# Patient Record
Sex: FEMALE | Race: WHITE
[De-identification: names, ages, dates, MRNs, and addresses within clinical notes are randomized per-mention and may not be internally consistent; named-entity substitution may affect disease eponyms.]

---

## 2017-04-14 ENCOUNTER — TRANSCRIPTION ENCOUNTER (OUTPATIENT)
Age: 77
End: 2017-04-14

## 2017-07-19 ENCOUNTER — TRANSCRIPTION ENCOUNTER (OUTPATIENT)
Age: 77
End: 2017-07-19

## 2017-09-02 ENCOUNTER — TRANSCRIPTION ENCOUNTER (OUTPATIENT)
Age: 77
End: 2017-09-02

## 2017-11-29 ENCOUNTER — TRANSCRIPTION ENCOUNTER (OUTPATIENT)
Age: 77
End: 2017-11-29

## 2018-11-08 ENCOUNTER — TRANSCRIPTION ENCOUNTER (OUTPATIENT)
Age: 78
End: 2018-11-08

## 2019-06-27 ENCOUNTER — TRANSCRIPTION ENCOUNTER (OUTPATIENT)
Age: 79
End: 2019-06-27

## 2019-09-05 ENCOUNTER — TRANSCRIPTION ENCOUNTER (OUTPATIENT)
Age: 79
End: 2019-09-05

## 2019-09-23 ENCOUNTER — TRANSCRIPTION ENCOUNTER (OUTPATIENT)
Age: 79
End: 2019-09-23

## 2019-11-17 ENCOUNTER — TRANSCRIPTION ENCOUNTER (OUTPATIENT)
Age: 79
End: 2019-11-17

## 2020-02-03 ENCOUNTER — TRANSCRIPTION ENCOUNTER (OUTPATIENT)
Age: 80
End: 2020-02-03

## 2020-07-21 ENCOUNTER — TRANSCRIPTION ENCOUNTER (OUTPATIENT)
Age: 80
End: 2020-07-21

## 2020-10-04 ENCOUNTER — TRANSCRIPTION ENCOUNTER (OUTPATIENT)
Age: 80
End: 2020-10-04

## 2021-07-02 ENCOUNTER — TRANSCRIPTION ENCOUNTER (OUTPATIENT)
Age: 81
End: 2021-07-02

## 2022-02-04 PROBLEM — Z00.00 ENCOUNTER FOR PREVENTIVE HEALTH EXAMINATION: Status: ACTIVE | Noted: 2022-02-04

## 2022-02-06 ENCOUNTER — NON-APPOINTMENT (OUTPATIENT)
Age: 82
End: 2022-02-06

## 2022-02-07 ENCOUNTER — APPOINTMENT (OUTPATIENT)
Dept: OTOLARYNGOLOGY | Facility: CLINIC | Age: 82
End: 2022-02-07
Payer: MEDICARE

## 2022-02-07 VITALS — WEIGHT: 127 LBS | TEMPERATURE: 97.8 F | BODY MASS INDEX: 24.94 KG/M2 | HEIGHT: 60 IN

## 2022-02-07 DIAGNOSIS — H92.09 OTALGIA, UNSPECIFIED EAR: ICD-10-CM

## 2022-02-07 DIAGNOSIS — Z86.59 PERSONAL HISTORY OF OTHER MENTAL AND BEHAVIORAL DISORDERS: ICD-10-CM

## 2022-02-07 DIAGNOSIS — Z87.891 PERSONAL HISTORY OF NICOTINE DEPENDENCE: ICD-10-CM

## 2022-02-07 DIAGNOSIS — Z78.9 OTHER SPECIFIED HEALTH STATUS: ICD-10-CM

## 2022-02-07 DIAGNOSIS — J06.9 ACUTE UPPER RESPIRATORY INFECTION, UNSPECIFIED: ICD-10-CM

## 2022-02-07 DIAGNOSIS — Z86.79 PERSONAL HISTORY OF OTHER DISEASES OF THE CIRCULATORY SYSTEM: ICD-10-CM

## 2022-02-07 DIAGNOSIS — J02.9 ACUTE PHARYNGITIS, UNSPECIFIED: ICD-10-CM

## 2022-02-07 PROCEDURE — 99203 OFFICE O/P NEW LOW 30 MIN: CPT

## 2022-02-07 PROCEDURE — 31575 DIAGNOSTIC LARYNGOSCOPY: CPT

## 2022-02-07 RX ORDER — DOCUSATE SODIUM 100 MG/1
CAPSULE ORAL
Refills: 0 | Status: ACTIVE | COMMUNITY

## 2022-02-07 RX ORDER — AMLODIPINE BESYLATE 5 MG/1
5 TABLET ORAL
Refills: 0 | Status: ACTIVE | COMMUNITY

## 2022-02-07 RX ORDER — IRBESARTAN 300 MG/1
TABLET ORAL
Refills: 0 | Status: ACTIVE | COMMUNITY

## 2022-02-07 RX ORDER — FLURAZEPAM HYDROCHLORIDE 15 MG/1
15 CAPSULE ORAL
Refills: 0 | Status: ACTIVE | COMMUNITY

## 2022-02-07 NOTE — HISTORY OF PRESENT ILLNESS
[de-identified] : Patient reports January 21 had symptoms of sore throat, shivers for an hour in the morning, and right otalgia radiating to the neck.  She presented to ENT and allergy when a Covid test was negative that same day, and was diagnosed with acute rhinosinusitis.  She was prescribed Ceftin but never improved.  2 days later a Covid test was positive and she had symptoms of sneezing and watery eyes.  She still has sore throat located below the larynx a raw sensation in her airway, shivers and persistent right otalgia radiating to the neck

## 2022-02-07 NOTE — ASSESSMENT
[FreeTextEntry1] : Persistent symptoms after a positive Covid diagnosis.  We discussed this could be due to residual infection.  I recommended 2-week follow-up to be sure she has improved.  In the meantime symptomatic relief from Tylenol, Advil, Chloraseptic, humidifier, steam inhalation.. I see no other source of referred otalgia such as head neck mass on her endoscopy.  The pain is actually quite low almost at the sternal notch.  I told her I cannot visualize this area with the endoscope.  I offered her throat culture but she refused given her recent course of antibiotics which was not helpful would most likely skew the results

## 2022-02-22 ENCOUNTER — APPOINTMENT (OUTPATIENT)
Dept: OTOLARYNGOLOGY | Facility: CLINIC | Age: 82
End: 2022-02-22

## 2022-05-22 ENCOUNTER — NON-APPOINTMENT (OUTPATIENT)
Age: 82
End: 2022-05-22

## 2022-07-23 ENCOUNTER — NON-APPOINTMENT (OUTPATIENT)
Age: 82
End: 2022-07-23

## 2022-07-29 ENCOUNTER — NON-APPOINTMENT (OUTPATIENT)
Age: 82
End: 2022-07-29

## 2022-09-20 ENCOUNTER — APPOINTMENT (OUTPATIENT)
Dept: OTOLARYNGOLOGY | Facility: CLINIC | Age: 82
End: 2022-09-20

## 2022-09-20 VITALS — WEIGHT: 115 LBS | BODY MASS INDEX: 22.58 KG/M2 | TEMPERATURE: 96.8 F | HEIGHT: 60 IN

## 2022-09-20 DIAGNOSIS — H61.21 IMPACTED CERUMEN, RIGHT EAR: ICD-10-CM

## 2022-09-20 DIAGNOSIS — H93.13 TINNITUS, BILATERAL: ICD-10-CM

## 2022-09-20 PROCEDURE — G0268 REMOVAL OF IMPACTED WAX MD: CPT

## 2022-09-20 PROCEDURE — 99213 OFFICE O/P EST LOW 20 MIN: CPT | Mod: 25

## 2022-09-20 RX ORDER — DIVALPROEX SODIUM 500 MG/1
TABLET, DELAYED RELEASE ORAL
Refills: 0 | Status: ACTIVE | COMMUNITY

## 2022-09-20 RX ORDER — CLONAZEPAM 2 MG/1
TABLET ORAL
Refills: 0 | Status: ACTIVE | COMMUNITY

## 2022-09-20 RX ORDER — DULOXETINE HYDROCHLORIDE 30 MG/1
CAPSULE, DELAYED RELEASE ORAL
Refills: 0 | Status: ACTIVE | COMMUNITY

## 2022-09-20 RX ORDER — PREDNISONE 10 MG/1
10 TABLET ORAL
Qty: 20 | Refills: 0 | Status: ACTIVE | COMMUNITY
Start: 2022-09-20 | End: 1900-01-01

## 2022-09-20 RX ORDER — TOBRAMYCIN 3 MG/ML
SOLUTION/ DROPS OPHTHALMIC
Refills: 0 | Status: ACTIVE | COMMUNITY

## 2022-09-20 RX ORDER — ALPRAZOLAM 2 MG/1
TABLET ORAL
Refills: 0 | Status: ACTIVE | COMMUNITY

## 2022-09-20 NOTE — REVIEW OF SYSTEMS
[Hearing Loss] : hearing loss [Patient Intake Form Reviewed] : Patient intake form was reviewed [de-identified] : tinnitus

## 2022-09-20 NOTE — ASSESSMENT
[FreeTextEntry1] : PERLA HUTCHINS developed bilateral tinnitus after CT with contrast approximately weeks ago. Audiogram shows mild bilateral HFSNHL.\par I suggested Prednisone 10 to 20mg/day.\par I discussed the risks of taking steroid medication including the risk of, osteoporosis and bone, fracture, GI problems, cataracts and mood changes. The patient indicated to me that he understands the risks.\par \par

## 2022-09-20 NOTE — HISTORY OF PRESENT ILLNESS
[de-identified] : PERLA HUTCHINS is a 82 year woman with a history of 2 months of bilateral tinnitus after a CT scan with contrast. She si not sure if her hearing has changed.

## 2023-05-27 ENCOUNTER — NON-APPOINTMENT (OUTPATIENT)
Age: 83
End: 2023-05-27

## 2024-01-04 ENCOUNTER — APPOINTMENT (OUTPATIENT)
Dept: ORTHOPEDIC SURGERY | Facility: CLINIC | Age: 84
End: 2024-01-04
Payer: MEDICARE

## 2024-01-04 VITALS — BODY MASS INDEX: 21.99 KG/M2 | HEIGHT: 60 IN | RESPIRATION RATE: 16 BRPM | WEIGHT: 112 LBS

## 2024-01-04 DIAGNOSIS — M19.031 PRIMARY OSTEOARTHRITIS, RIGHT WRIST: ICD-10-CM

## 2024-01-04 DIAGNOSIS — Z82.61 FAMILY HISTORY OF ARTHRITIS: ICD-10-CM

## 2024-01-04 PROCEDURE — 99203 OFFICE O/P NEW LOW 30 MIN: CPT

## 2024-01-04 NOTE — ASSESSMENT
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  Differential diagnosis includes occult fracture or ligamentous injury.  She recently obtained an MRI but results are unavailable for review.  I recommended splinting until the MRI is reviewed and based on results of the MRI more aggressive forms of treatment such as injection or therapy may be considered.  Patient will make sure that the results of the MRI are forwarded to us so that we can review them and proceed with a definitive plan.

## 2024-01-04 NOTE — PHYSICAL EXAM
[de-identified] : Physical exam shows the patient to be alert and oriented x3, capable of ambulation. The patient is well-developed and well-nourished in no apparent respiratory distress. Majority of the skin is intact bilaterally in the upper extremities without lymphadenopathy at the elbows.  There is tenderness over the right basal joint with a positive grind test and shoulder deformity. There is a positive crank test. There is swelling appreciated over the affected CMC joint which is not present on the opposite side. There is no evidence of infection or lymphadenopathy bilaterally to the level of the elbows There is no evidence of MCP hyperextension bilaterally. There is a negative Finkelstein's test There is no tenderness over the A-1 pulleys bilaterally. 5/5 strength bilaterally.   The wrists have a symmetric and full range of motion bilaterally with no pain upon forced flexion, extension, pronation and supination. There is no tenderness over the scaphoid scapholunate region ligaments and no tenderness of the TFCC bilaterally. There is no tenderness of the pisotriquetral hamate hook. The second through fifth CMC his is stable and nontender bilaterally.  There is a negative carpal tunnel compression test or Tinel's bilaterally.   [de-identified] : PA lateral and oblique of the right hand performed on December 12, 2023 shows right thumb CMC and STT arthritis with the radiocarpal and midcarpal joints relatively well-preserved.  No evidence of soft tissue calcifications.

## 2024-01-04 NOTE — HISTORY OF PRESENT ILLNESS
[Right] : right hand dominant [FreeTextEntry1] : Patient presents today for an evaluation of the right wrist. Patient states that she's been having shooting and burning pain for a month now. Patient assumes that she injured her right wrist by lifting weight and doing resistance exercises at the gym. The patient states that her left-hand hurts as well since she injured the right wrist. Patient reports that she's been attending hand therapy at [facility] and she had an Xray done at [facility] on 12/12/2023. Patient has been splinting her right wrist.   IMPRESSION: There is no displaced fracture. Irregularity of the radial aspect of the distal radius is similar compared to the prior study.                   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  She obtain an MRI yesterday but the reading has not been done.  Patient has had 1 therapy visit and is uncertain whether this helped.  She is also been splinting.

## 2024-01-06 ENCOUNTER — TRANSCRIPTION ENCOUNTER (OUTPATIENT)
Age: 84
End: 2024-01-06

## 2024-01-10 ENCOUNTER — APPOINTMENT (OUTPATIENT)
Dept: ORTHOPEDIC SURGERY | Facility: CLINIC | Age: 84
End: 2024-01-10

## 2024-07-23 ENCOUNTER — NON-APPOINTMENT (OUTPATIENT)
Age: 84
End: 2024-07-23

## 2024-08-22 ENCOUNTER — NON-APPOINTMENT (OUTPATIENT)
Age: 84
End: 2024-08-22

## 2024-09-13 ENCOUNTER — APPOINTMENT (OUTPATIENT)
Dept: OTOLARYNGOLOGY | Facility: CLINIC | Age: 84
End: 2024-09-13
Payer: MEDICARE

## 2024-09-13 ENCOUNTER — NON-APPOINTMENT (OUTPATIENT)
Age: 84
End: 2024-09-13

## 2024-09-13 DIAGNOSIS — R05.9 COUGH, UNSPECIFIED: ICD-10-CM

## 2024-09-13 PROCEDURE — 99213 OFFICE O/P EST LOW 20 MIN: CPT

## 2024-09-13 RX ORDER — AZITHROMYCIN 250 MG/1
250 TABLET, FILM COATED ORAL
Qty: 1 | Refills: 0 | Status: ACTIVE | COMMUNITY
Start: 2024-09-13 | End: 1900-01-01

## 2024-09-16 PROBLEM — R05.9 COUGH: Status: ACTIVE | Noted: 2024-09-16

## 2024-09-16 NOTE — HISTORY OF PRESENT ILLNESS
[de-identified] : Well-known to the practice. She reports that she has had a recent respiratory infection.  She reports she has not had an infection of this nature in about 20 years. Her compelling complaint at this point is cough with sputum. She tried to get into see her primary care doctor but he was not available so she presented to us. No fever.

## 2024-09-16 NOTE — ASSESSMENT
[FreeTextEntry1] : I have explained to her that I believe she has a bronchitis. I explained to her bronchitis is not a primary diagnosis we treat. However I will treat her with a course of Zithromax. I explained to her if her problem were to progress or persist she needed to follow-up with her primary care doctor.  She endorsed understanding this.

## 2025-02-13 ENCOUNTER — NON-APPOINTMENT (OUTPATIENT)
Age: 85
End: 2025-02-13

## 2025-02-22 ENCOUNTER — NON-APPOINTMENT (OUTPATIENT)
Age: 85
End: 2025-02-22

## 2025-06-21 ENCOUNTER — NON-APPOINTMENT (OUTPATIENT)
Age: 85
End: 2025-06-21

## 2025-06-23 ENCOUNTER — NON-APPOINTMENT (OUTPATIENT)
Age: 85
End: 2025-06-23

## 2025-06-24 ENCOUNTER — APPOINTMENT (OUTPATIENT)
Dept: UROLOGY | Facility: CLINIC | Age: 85
End: 2025-06-24
Payer: MEDICARE

## 2025-06-24 ENCOUNTER — NON-APPOINTMENT (OUTPATIENT)
Age: 85
End: 2025-06-24

## 2025-06-24 VITALS
HEIGHT: 60 IN | TEMPERATURE: 98 F | SYSTOLIC BLOOD PRESSURE: 145 MMHG | BODY MASS INDEX: 21.2 KG/M2 | HEART RATE: 97 BPM | WEIGHT: 108 LBS | DIASTOLIC BLOOD PRESSURE: 79 MMHG

## 2025-06-24 PROCEDURE — 99204 OFFICE O/P NEW MOD 45 MIN: CPT

## 2025-06-24 PROCEDURE — 51798 US URINE CAPACITY MEASURE: CPT

## 2025-06-24 PROCEDURE — G2211 COMPLEX E/M VISIT ADD ON: CPT

## 2025-07-15 ENCOUNTER — APPOINTMENT (OUTPATIENT)
Dept: UROLOGY | Facility: CLINIC | Age: 85
End: 2025-07-15
Payer: MEDICARE

## 2025-07-15 VITALS
HEART RATE: 85 BPM | OXYGEN SATURATION: 97 % | TEMPERATURE: 97.8 F | SYSTOLIC BLOOD PRESSURE: 149 MMHG | DIASTOLIC BLOOD PRESSURE: 80 MMHG

## 2025-07-15 PROCEDURE — 99215 OFFICE O/P EST HI 40 MIN: CPT

## 2025-07-17 PROBLEM — N32.81 OAB (OVERACTIVE BLADDER): Status: ACTIVE | Noted: 2025-06-25

## 2025-07-17 LAB — BACTERIA UR CULT: NORMAL

## 2025-09-12 ENCOUNTER — APPOINTMENT (OUTPATIENT)
Dept: UROLOGY | Facility: CLINIC | Age: 85
End: 2025-09-12

## 2025-09-13 ENCOUNTER — NON-APPOINTMENT (OUTPATIENT)
Age: 85
End: 2025-09-13